# Patient Record
Sex: FEMALE | Race: WHITE | Employment: FULL TIME | URBAN - NONMETROPOLITAN AREA
[De-identification: names, ages, dates, MRNs, and addresses within clinical notes are randomized per-mention and may not be internally consistent; named-entity substitution may affect disease eponyms.]

---

## 2021-09-24 ENCOUNTER — HOSPITAL ENCOUNTER (EMERGENCY)
Age: 21
Discharge: HOME | End: 2021-09-24
Attending: EMERGENCY MEDICINE
Payer: COMMERCIAL

## 2021-09-24 ENCOUNTER — APPOINTMENT (OUTPATIENT)
Dept: GENERAL RADIOLOGY | Age: 21
End: 2021-09-24
Payer: COMMERCIAL

## 2021-09-24 VITALS
OXYGEN SATURATION: 99 % | HEIGHT: 65 IN | WEIGHT: 180 LBS | HEART RATE: 76 BPM | DIASTOLIC BLOOD PRESSURE: 56 MMHG | SYSTOLIC BLOOD PRESSURE: 130 MMHG | BODY MASS INDEX: 29.99 KG/M2 | RESPIRATION RATE: 16 BRPM

## 2021-09-24 DIAGNOSIS — M79.671 ACUTE FOOT PAIN, RIGHT: Primary | ICD-10-CM

## 2021-09-24 LAB
ANION GAP SERPL CALCULATED.3IONS-SCNC: 11 MEQ/L (ref 8–16)
BASOPHILS # BLD: 0.6 %
BASOPHILS ABSOLUTE: 0.1 THOU/MM3 (ref 0–0.1)
BUN BLDV-MCNC: 16 MG/DL (ref 7–22)
C-REACTIVE PROTEIN: 0.49 MG/DL (ref 0–1)
CALCIUM SERPL-MCNC: 9.3 MG/DL (ref 8.5–10.5)
CHLORIDE BLD-SCNC: 102 MEQ/L (ref 98–111)
CO2: 24 MEQ/L (ref 23–33)
CREAT SERPL-MCNC: 0.6 MG/DL (ref 0.4–1.2)
EOSINOPHIL # BLD: 0.4 %
EOSINOPHILS ABSOLUTE: 0 THOU/MM3 (ref 0–0.4)
ERYTHROCYTE [DISTWIDTH] IN BLOOD BY AUTOMATED COUNT: 14.5 % (ref 11.5–14.5)
ERYTHROCYTE [DISTWIDTH] IN BLOOD BY AUTOMATED COUNT: 48.3 FL (ref 35–45)
GFR SERPL CREATININE-BSD FRML MDRD: > 90 ML/MIN/1.73M2
GLUCOSE BLD-MCNC: 90 MG/DL (ref 70–108)
HCT VFR BLD CALC: 40.7 % (ref 37–47)
HEMOGLOBIN: 13.4 GM/DL (ref 12–16)
IMMATURE GRANS (ABS): 0.03 THOU/MM3 (ref 0–0.07)
IMMATURE GRANULOCYTES: 0.3 %
LYMPHOCYTES # BLD: 29 %
LYMPHOCYTES ABSOLUTE: 3 THOU/MM3 (ref 1–4.8)
MCH RBC QN AUTO: 30.2 PG (ref 26–33)
MCHC RBC AUTO-ENTMCNC: 32.9 GM/DL (ref 32.2–35.5)
MCV RBC AUTO: 91.7 FL (ref 81–99)
MONOCYTES # BLD: 5.6 %
MONOCYTES ABSOLUTE: 0.6 THOU/MM3 (ref 0.4–1.3)
NUCLEATED RED BLOOD CELLS: 0 /100 WBC
OSMOLALITY CALCULATION: 274.5 MOSMOL/KG (ref 275–300)
PLATELET # BLD: 342 THOU/MM3 (ref 130–400)
PMV BLD AUTO: 9.7 FL (ref 9.4–12.4)
POTASSIUM REFLEX MAGNESIUM: 4.2 MEQ/L (ref 3.5–5.2)
RBC # BLD: 4.44 MILL/MM3 (ref 4.2–5.4)
SEDIMENTATION RATE, ERYTHROCYTE: 13 MM/HR (ref 0–20)
SEG NEUTROPHILS: 64.1 %
SEGMENTED NEUTROPHILS ABSOLUTE COUNT: 6.6 THOU/MM3 (ref 1.8–7.7)
SODIUM BLD-SCNC: 137 MEQ/L (ref 135–145)
WBC # BLD: 10.3 THOU/MM3 (ref 4.8–10.8)

## 2021-09-24 PROCEDURE — 6370000000 HC RX 637 (ALT 250 FOR IP): Performed by: NURSE PRACTITIONER

## 2021-09-24 PROCEDURE — 99283 EMERGENCY DEPT VISIT LOW MDM: CPT

## 2021-09-24 PROCEDURE — 73630 X-RAY EXAM OF FOOT: CPT

## 2021-09-24 PROCEDURE — 85651 RBC SED RATE NONAUTOMATED: CPT

## 2021-09-24 PROCEDURE — 86140 C-REACTIVE PROTEIN: CPT

## 2021-09-24 PROCEDURE — 80048 BASIC METABOLIC PNL TOTAL CA: CPT

## 2021-09-24 PROCEDURE — 85025 COMPLETE CBC W/AUTO DIFF WBC: CPT

## 2021-09-24 PROCEDURE — 36415 COLL VENOUS BLD VENIPUNCTURE: CPT

## 2021-09-24 RX ORDER — ACETAMINOPHEN 500 MG
1000 TABLET ORAL ONCE
Status: COMPLETED | OUTPATIENT
Start: 2021-09-24 | End: 2021-09-24

## 2021-09-24 RX ADMIN — ACETAMINOPHEN 1000 MG: 500 TABLET ORAL at 12:48

## 2021-09-24 ASSESSMENT — PAIN DESCRIPTION - PAIN TYPE: TYPE: ACUTE PAIN

## 2021-09-24 ASSESSMENT — PAIN SCALES - GENERAL
PAINLEVEL_OUTOF10: 8
PAINLEVEL_OUTOF10: 8

## 2021-09-24 ASSESSMENT — PAIN DESCRIPTION - DESCRIPTORS: DESCRIPTORS: SHOOTING;SHARP

## 2021-09-24 ASSESSMENT — PAIN DESCRIPTION - ORIENTATION: ORIENTATION: RIGHT

## 2021-09-24 ASSESSMENT — PAIN DESCRIPTION - LOCATION: LOCATION: FOOT

## 2021-09-24 NOTE — ED PROVIDER NOTES
[address] [address]          Pt Name: Nicki Lynn  MRN: [id number]  [place] 2000  Date of evaluation: 9/24/2021  Treating Resident Physician: Ashley Valladares MD  Supervising Physician:  Jaden Webster,     [facility]       Chief Complaint   Patient presents with    Foot Pain     right     History obtained from the patient. HISTORY OF PRESENT ILLNESS    HPI  Nicki Lynn is a 22 y.o. female who presents to the emergency department for evaluation of right foot pain. Patient states that she has had right foot pain for the last day. Patient states that she has a raised area on the bottom of her foot does not remember injuring it. Patient states the pain is a 6 out of 10 on pain scale when ambulating. Patient states that pain is worse with movement. Pain is resolved when not walking or touching area. The patient has no other acute complaints at this time. REVIEW OF SYSTEMS   Review of Systems   Constitutional: Negative for fatigue and fever. HENT: Negative for ear pain, rhinorrhea and sore throat. Respiratory: Negative for cough, shortness of breath and wheezing. Cardiovascular: Negative for chest pain, palpitations and leg swelling. Gastrointestinal: Negative for abdominal distention, constipation, diarrhea, nausea and vomiting. Endocrine: Negative for polydipsia and polyuria. Genitourinary: Negative for difficulty urinating and dysuria. Musculoskeletal: Negative for arthralgias. Right foot pain. Skin: Negative for color change, pallor and rash. Neurological: Negative for dizziness, seizures, syncope, weakness and numbness. PAST MEDICAL AND SURGICAL HISTORY   No past medical history on file. No past surgical history on file. MEDICATIONS   No current facility-administered medications for this encounter.     Current Outpatient Medications:    brompheniramine-pseudoephedrine-DM 30-2-10 MG/5ML syrup, Take 5 mLs by mouth 4 times daily as needed for Congestion or Cough. , Disp: 120 mL, Rfl: 0      SOCIAL HISTORY     Social History     Social History Narrative    Not on file     Social History     Tobacco Use    Smoking status: Never Smoker    Smokeless tobacco: Never Used   Substance Use Topics    Alcohol use: No    Drug use: No         ALLERGIES     Allergies   Allergen Reactions    Amoxicillin Nausea And Vomiting         FAMILY HISTORY     Family History   Problem Relation Age of Onset    High Blood Pressure Mother     High Blood Pressure Maternal Grandmother     High Blood Pressure Maternal Grandfather     Heart Disease Maternal Grandfather          PREVIOUS RECORDS   Previous records reviewed: Today    PHYSICAL EXAM     ED Triage Vitals [09/24/21 0955]   BP Temp Temp src Pulse Resp SpO2 Height Weight   112/77 -- -- 90 20 98 % 5' 5\" (1.651 m) 180 lb (81.6 kg)     Initial vital signs and nursing assessment reviewed and normal. Body mass index is 29.95 kg/m². Pulsoximetry is normal per my interpretation. Additional Vital Signs:  Vitals:    09/24/21 1309   BP: (!) 130/56   Pulse: 76   Resp: 16   SpO2: 99%       Physical Exam  Constitutional:       Appearance: Normal appearance. HENT:      Head: Normocephalic. Right Ear: External ear normal.      Left Ear: External ear normal.      Nose: Nose normal.      Mouth/Throat:      Mouth: Mucous membranes are moist.      Pharynx: Oropharynx is clear. Eyes:      Extraocular Movements: Extraocular movements intact. Conjunctiva/sclera: Conjunctivae normal.      Pupils: Pupils are equal, round, and reactive to light. Cardiovascular:      Rate and Rhythm: Normal rate and regular rhythm. Pulses: Normal pulses. Heart sounds: Normal heart sounds. Pulmonary:      Effort: Pulmonary effort is normal.      Breath sounds: Normal breath sounds.    Abdominal:      General: Bowel sounds are normal.      Palpations: Abdomen is soft. Musculoskeletal:         General: Normal range of motion. Cervical back: Normal range of motion and neck supple. Skin:     General: Skin is warm and dry. Capillary Refill: Capillary refill takes less than 2 seconds. Comments: Raised area to planter surface mid metatarsal area. Area tender with palpation. Patient has no erythema or warmth to area. Neurological:      General: No focal deficit present. Mental Status: She is alert and oriented to person, place, and time. MEDICAL DECISION MAKING   Initial Assessment:   Patient is a 41-year-old female complaining of right foot pain. Patient on exam noted with a raised area to the plantar aspect mid metatarsal area. No fluctuance, erythema or warmth to area. Surgery saw patient and did bedside incision. They stated to have patient follow-up outpatient with podiatry. Podiatry resident stated that this area was likely cyst and less likely to be infected abscess. They do not recommend antibiotic therapy at this time. Patient differential diagnosis includes but is not limited to cyst, abscess, Hallman's neuroma, muscular skeletal pain, and blister. Patient will be discharged at this time with right foot pain. Plan:   Patient discharged home at this time instructed to follow-up with podiatry. ED RESULTS   Laboratory results:  Labs Reviewed   CBC WITH AUTO DIFFERENTIAL - Abnormal; Notable for the following components:       Result Value    RDW-SD 48.3 (*)     All other components within normal limits   OSMOLALITY - Abnormal; Notable for the following components:    Osmolality Calc 274.5 (*)     All other components within normal limits   BASIC METABOLIC PANEL W/ REFLEX TO MG FOR LOW K   C-REACTIVE PROTEIN   SEDIMENTATION RATE   ANION GAP   GLOMERULAR FILTRATION RATE, ESTIMATED       Radiologic studies results:  XR FOOT RIGHT (MIN 3 VIEWS)   Final Result    No fracture.

## 2021-09-24 NOTE — ED NOTES
Assumed patient care. Pt resp regular. Pt rates that foot pain is about 6/10 at this time. Pt denies any other needs. Call light within reach.       Yamel Nolen RN  09/24/21 0561

## 2021-09-24 NOTE — CONSULTS
Podiatric Surgery Consult    Reason for Consult:  Right foot pain  Requesting Physician:  Dr. Gary Aldrich DO    CHIEF COMPLAINT:  Right foot pain    HISTORY OF PRESENT ILLNESS:                The patient is a 22 y.o. female with no significant medical history who is seen at bedside on behalf of Dr. Adore Dolan. Patient states she noticed some pain to the ball of her right foot on Tuesday, and the pain has gotten worse to the point that she can no longer bear weight on the right foot. Patient denies trauma to the area. Patient denies N/V/F/SOB/CP or other symptoms at this time. Past Medical History:    No past medical history on file. Past Surgical History:    No past surgical history on file. Current Medications:    No current facility-administered medications for this encounter. Allergies:  Amoxicillin    Social History:    TOBACCO:  Never used tobacco  ETOH:  Never drank alcohol  DRUGS:  Never used recreational drugs  Family History:       Problem Relation Age of Onset    High Blood Pressure Mother     High Blood Pressure Maternal Grandmother     High Blood Pressure Maternal Grandfather     Heart Disease Maternal Grandfather      REVIEW OF SYSTEMS:    Pertinent items are listed in HPI.    PHYSICAL EXAM:        Vitals:    BP (!) 130/56   Pulse 76   Resp 16   Ht 5' 5\" (1.651 m)   Wt 180 lb (81.6 kg)   SpO2 99%   BMI 29.95 kg/m²     Results for orders placed or performed during the hospital encounter of 09/24/21   CBC auto differential   Result Value Ref Range    WBC 10.3 4.8 - 10.8 thou/mm3    RBC 4.44 4.20 - 5.40 mill/mm3    Hemoglobin 13.4 12.0 - 16.0 gm/dl    Hematocrit 40.7 37.0 - 47.0 %    MCV 91.7 81.0 - 99.0 fL    MCH 30.2 26.0 - 33.0 pg    MCHC 32.9 32.2 - 35.5 gm/dl    RDW-CV 14.5 11.5 - 14.5 %    RDW-SD 48.3 (H) 35.0 - 45.0 fL    Platelets 468 660 - 911 thou/mm3    MPV 9.7 9.4 - 12.4 fL    Seg Neutrophils 64.1 %    Lymphocytes 29.0 %    Monocytes 5.6 %    Eosinophils 0.4 %    Basophils 0.6 %    Immature Granulocytes 0.3 %    Segs Absolute 6.6 1 - 7 thou/mm3    Lymphocytes Absolute 3.0 1.0 - 4.8 thou/mm3    Monocytes Absolute 0.6 0.4 - 1.3 thou/mm3    Eosinophils Absolute 0.0 0.0 - 0.4 thou/mm3    Basophils Absolute 0.1 0.0 - 0.1 thou/mm3    Immature Grans (Abs) 0.03 0.00 - 0.07 thou/mm3    nRBC 0 /100 wbc   Basic Metabolic Panel w/ Reflex to MG   Result Value Ref Range    Sodium 137 135 - 145 meq/L    Potassium reflex Magnesium 4.2 3.5 - 5.2 meq/L    Chloride 102 98 - 111 meq/L    CO2 24 23 - 33 meq/L    Glucose 90 70 - 108 mg/dL    BUN 16 7 - 22 mg/dL    CREATININE 0.6 0.4 - 1.2 mg/dL    Calcium 9.3 8.5 - 10.5 mg/dL   C-reactive protein   Result Value Ref Range    CRP 0.49 0.00 - 1.00 mg/dl   Anion Gap   Result Value Ref Range    Anion Gap 11.0 8.0 - 16.0 meq/L   Glomerular Filtration Rate, Estimated   Result Value Ref Range    Est, Glom Filt Rate >90 ml/min/1.73m2   Osmolality   Result Value Ref Range    Osmolality Calc 274.5 (L) 275.0 - 300.0 mOsmol/kg       Exam:     Vascular: Dorsalis pedis and posterior tibial pulses are easily palpable bilaterally. Skin temperature is warm to cool from proximal tibial tuberosity to distal digits. CFT brisk to exposed digits. No edema or erythema noted. Hair growth present. Quality of skin wnl for patient age. Dermatologic: No open lesions noted b/l. Subcutaneous nodule, firm, immobile measuring 0.5cm in diameter present to plantar aspect of right forefoot, sub 3rd metatarsal head. No surrounding erythema or edema noted. No drainage or sinus tract noted. Neurovascular: Proprioception intact at 10/10 sites as tested with a 5.07 semmes arnulfo monofilament. Light touch sensation is intact b/l. Musculoskeletal: Muscle strength 5/5 for all plantarflexors, dorsiflexors, inverters and everters examined. Full ROM noted at the STJ with the knee extended and flexed, AJ, MTJ, 1st MPJ bilaterally. Exquisite pain to palpation of nodule right foot. XRAY:  XR FOOT RIGHT (MIN 3 VIEWS)    Result Date: 9/24/2021  PROCEDURE: XR FOOT RIGHT (MIN 3 VIEWS) CLINICAL INFORMATION: pain. COMPARISON: No prior study. TECHNIQUE: 3 views of the right foot. FINDINGS: There is no fracture or dislocation. The digits are normal.  The metatarsals are normal. The metatarsals are normally aligned with their respective tarsal bones. The base of the fifth metatarsal is normal. The joint spaces are preserved. The calcaneus is normal. The soft tissues are normal.      No fracture. **This report has been created using voice recognition software. It may contain minor errors which are inherent in voice recognition technology. ** Final report electronically signed by DR Elier Vera on 9/24/2021 10:30 AM    LABS:   Results for orders placed or performed during the hospital encounter of 09/24/21   CBC auto differential   Result Value Ref Range    WBC 10.3 4.8 - 10.8 thou/mm3    RBC 4.44 4.20 - 5.40 mill/mm3    Hemoglobin 13.4 12.0 - 16.0 gm/dl    Hematocrit 40.7 37.0 - 47.0 %    MCV 91.7 81.0 - 99.0 fL    MCH 30.2 26.0 - 33.0 pg    MCHC 32.9 32.2 - 35.5 gm/dl    RDW-CV 14.5 11.5 - 14.5 %    RDW-SD 48.3 (H) 35.0 - 45.0 fL    Platelets 308 545 - 612 thou/mm3    MPV 9.7 9.4 - 12.4 fL    Seg Neutrophils 64.1 %    Lymphocytes 29.0 %    Monocytes 5.6 %    Eosinophils 0.4 %    Basophils 0.6 %    Immature Granulocytes 0.3 %    Segs Absolute 6.6 1 - 7 thou/mm3    Lymphocytes Absolute 3.0 1.0 - 4.8 thou/mm3    Monocytes Absolute 0.6 0.4 - 1.3 thou/mm3    Eosinophils Absolute 0.0 0.0 - 0.4 thou/mm3    Basophils Absolute 0.1 0.0 - 0.1 thou/mm3    Immature Grans (Abs) 0.03 0.00 - 0.07 thou/mm3    nRBC 0 /100 wbc   Basic Metabolic Panel w/ Reflex to MG   Result Value Ref Range    Sodium 137 135 - 145 meq/L    Potassium reflex Magnesium 4.2 3.5 - 5.2 meq/L    Chloride 102 98 - 111 meq/L    CO2 24 23 - 33 meq/L    Glucose 90 70 - 108 mg/dL    BUN 16 7 - 22 mg/dL    CREATININE 0.6 0.4 - 1.2 mg/dL Calcium 9.3 8.5 - 10.5 mg/dL   C-reactive protein   Result Value Ref Range    CRP 0.49 0.00 - 1.00 mg/dl   Anion Gap   Result Value Ref Range    Anion Gap 11.0 8.0 - 16.0 meq/L   Glomerular Filtration Rate, Estimated   Result Value Ref Range    Est, Glom Filt Rate >90 ml/min/1.73m2   Osmolality   Result Value Ref Range    Osmolality Calc 274.5 (L) 275.0 - 300.0 mOsmol/kg     Incision and Drainage Note    Type of Incision and Drainage:    [x] Simple    [] Complex    [] Hematoma/Seroma  [] Abscess soft tissue deep   [] Abscess ankle       Performed by: Lauren Ryan DPM    Consent obtained: Yes    Time out taken: Yes    Pain Control:       Drainage Type: serosanguinous    Instruments: 18-gauge needle    Location of Incision and Drainage:    Right foot  Wound #: 1    Incision and Drainage Size cm 0.5 x 0.5cm      Culture sent: No     Bleeding:with a small amount of bleeding    Hemostasis Achieved: by pressure    Procedural Pain: 8  / 10     Post Procedural Pain: 0 / 10     Response to treatment: Well tolerated by patient. Assessment  Principle    Subcutaneous cyst, right foot    Plan  Patient initially examined and evaluated  Cyst to right foot drained without incident see procedure notation above  Dressed right foot with gauze 4x4, lucretia. Instructed patient to bear weight to right foot on heel only  Patient is to follow-up in office with Dr. Adore Dolan as soon as possible  Educated patient on etiology of condition and clinical signs of infection, including drainage, warmth, erythema, and swelling  Patient expressed good understanding of treatment plan  Patient is ok to DC    DISPO: Patient ok to DC per podiatry, patient to follow-up in office with Dr. Adore Dolan. Dr. Isidra Whalen, thank you for the consultation, allowing podiatry to assist in the medical welfare of this patient. Podiatry will continue to follow this patient throughout the duration of hospitalization.    Lauren Ryan DPM  9/24/2021 2:08 PM

## 2021-09-25 ASSESSMENT — ENCOUNTER SYMPTOMS
SHORTNESS OF BREATH: 0
SORE THROAT: 0
COUGH: 0
WHEEZING: 0
CONSTIPATION: 0
NAUSEA: 0
DIARRHEA: 0
COLOR CHANGE: 0
RHINORRHEA: 0
VOMITING: 0
ABDOMINAL DISTENTION: 0

## 2021-09-25 NOTE — ED PROVIDER NOTES
[facility] [address] [address] EMERGENCY DEPT  ED Attending Physician Attestation     I performed a history and physical examination of the patient and discussed management with the Resident. I reviewed the Resident's note and agree with the documented findings and plan of care. Any areas of disagreement are noted on the chart. I was personally present for the key portions of any procedures and have documented in the chart those procedures where I was not present during the key portions. I have reviewed the emergency nurses triage note and agree with the chief complaint, past medical history, past surgical history, allergies, medications, social and family history as documented unless otherwise noted below. For Advanced Practice Clinician cases, I have personally evaluated this patient and have completed at least one key elements of the E/M. Additional findings and any areas of disagreement are as noted. History     22 y.o. female    Painful area to plantar surface of foot  No injury, no foreign body  No fever  Pain both with weight bearing and at rest.    Vitals:    Vitals:    09/24/21 0955 09/24/21 1309   BP: 112/77 (!) 130/56   Pulse: 90 76   Resp: 20 16   SpO2: 98% 99%   Weight: 180 lb (81.6 kg)    Height: 5' 5\" (1.651 m)        Known Problems: There is no problem list on file for this patient. Physical Exam     Extrem:  R foot 2cm area of fluctuance and tenderness. No erythema or induration. No edema, neg homans. DIAGNOSTIC RESULTS   RADIOLOGY:   XR FOOT RIGHT (MIN 3 VIEWS)   Final Result    No fracture. **This report has been created using voice recognition software. It may contain minor errors which are inherent in voice recognition technology. **      Final report electronically signed by DR Ankush Dobbs on 9/24/2021 10:30 AM          LABS:  Labs Reviewed   CBC WITH AUTO DIFFERENTIAL - Abnormal; Notable for the following components:       Result Value    RDW-SD 48.3 (*)     All other components within normal limits   OSMOLALITY - Abnormal; Notable for the following components:    Osmolality Calc 274.5 (*)     All other components within normal limits   BASIC METABOLIC PANEL W/ REFLEX TO MG FOR LOW K   C-REACTIVE PROTEIN   SEDIMENTATION RATE   ANION GAP   GLOMERULAR FILTRATION RATE, ESTIMATED       EMERGENCY DEPARTMENT COURSE:        Medical Decision-Making:   Case d/w podiatry who eval pt at the bedside and would like to I&D    George Scanlon [facility]  Attending Emergency Physician        Francis Grey, DO  09/25/21 5803

## 2021-10-02 ENCOUNTER — APPOINTMENT (OUTPATIENT)
Dept: CT IMAGING | Age: 21
End: 2021-10-02
Payer: COMMERCIAL

## 2021-10-02 ENCOUNTER — HOSPITAL ENCOUNTER (EMERGENCY)
Age: 21
Discharge: HOME HEALTH | End: 2021-10-02
Attending: EMERGENCY MEDICINE
Payer: COMMERCIAL

## 2021-10-02 VITALS
RESPIRATION RATE: 16 BRPM | WEIGHT: 180 LBS | BODY MASS INDEX: 29.99 KG/M2 | HEIGHT: 65 IN | OXYGEN SATURATION: 99 % | DIASTOLIC BLOOD PRESSURE: 76 MMHG | SYSTOLIC BLOOD PRESSURE: 114 MMHG | TEMPERATURE: 98.5 F | HEART RATE: 83 BPM

## 2021-10-02 DIAGNOSIS — L02.611 ABSCESS OF RIGHT FOOT: Primary | ICD-10-CM

## 2021-10-02 LAB
ALBUMIN SERPL-MCNC: 4.5 G/DL (ref 3.5–5.1)
ALP BLD-CCNC: 111 U/L (ref 38–126)
ALT SERPL-CCNC: 15 U/L (ref 11–66)
ANION GAP SERPL CALCULATED.3IONS-SCNC: 11 MEQ/L (ref 8–16)
AST SERPL-CCNC: 16 U/L (ref 5–40)
BASOPHILS # BLD: 0.4 %
BASOPHILS ABSOLUTE: 0 THOU/MM3 (ref 0–0.1)
BILIRUB SERPL-MCNC: 0.2 MG/DL (ref 0.3–1.2)
BUN BLDV-MCNC: 20 MG/DL (ref 7–22)
CALCIUM SERPL-MCNC: 9.9 MG/DL (ref 8.5–10.5)
CHLORIDE BLD-SCNC: 104 MEQ/L (ref 98–111)
CO2: 26 MEQ/L (ref 23–33)
CREAT SERPL-MCNC: 0.7 MG/DL (ref 0.4–1.2)
EOSINOPHIL # BLD: 0.4 %
EOSINOPHILS ABSOLUTE: 0 THOU/MM3 (ref 0–0.4)
ERYTHROCYTE [DISTWIDTH] IN BLOOD BY AUTOMATED COUNT: 14.4 % (ref 11.5–14.5)
ERYTHROCYTE [DISTWIDTH] IN BLOOD BY AUTOMATED COUNT: 47.8 FL (ref 35–45)
GFR SERPL CREATININE-BSD FRML MDRD: > 90 ML/MIN/1.73M2
GLUCOSE BLD-MCNC: 91 MG/DL (ref 70–108)
HCT VFR BLD CALC: 40.9 % (ref 37–47)
HEMOGLOBIN: 13.6 GM/DL (ref 12–16)
IMMATURE GRANS (ABS): 0.03 THOU/MM3 (ref 0–0.07)
IMMATURE GRANULOCYTES: 0.3 %
LYMPHOCYTES # BLD: 28.1 %
LYMPHOCYTES ABSOLUTE: 3.2 THOU/MM3 (ref 1–4.8)
MCH RBC QN AUTO: 30.4 PG (ref 26–33)
MCHC RBC AUTO-ENTMCNC: 33.3 GM/DL (ref 32.2–35.5)
MCV RBC AUTO: 91.3 FL (ref 81–99)
MONOCYTES # BLD: 7.8 %
MONOCYTES ABSOLUTE: 0.9 THOU/MM3 (ref 0.4–1.3)
NUCLEATED RED BLOOD CELLS: 0 /100 WBC
OSMOLALITY CALCULATION: 283.5 MOSMOL/KG (ref 275–300)
PLATELET # BLD: 380 THOU/MM3 (ref 130–400)
PMV BLD AUTO: 9.6 FL (ref 9.4–12.4)
POTASSIUM SERPL-SCNC: 4.3 MEQ/L (ref 3.5–5.2)
RBC # BLD: 4.48 MILL/MM3 (ref 4.2–5.4)
SEG NEUTROPHILS: 63 %
SEGMENTED NEUTROPHILS ABSOLUTE COUNT: 7.2 THOU/MM3 (ref 1.8–7.7)
SODIUM BLD-SCNC: 141 MEQ/L (ref 135–145)
TOTAL PROTEIN: 8.2 G/DL (ref 6.1–8)
WBC # BLD: 11.4 THOU/MM3 (ref 4.8–10.8)

## 2021-10-02 PROCEDURE — 96375 TX/PRO/DX INJ NEW DRUG ADDON: CPT

## 2021-10-02 PROCEDURE — 87205 SMEAR GRAM STAIN: CPT

## 2021-10-02 PROCEDURE — 87186 SC STD MICRODIL/AGAR DIL: CPT

## 2021-10-02 PROCEDURE — 87077 CULTURE AEROBIC IDENTIFY: CPT

## 2021-10-02 PROCEDURE — 99283 EMERGENCY DEPT VISIT LOW MDM: CPT

## 2021-10-02 PROCEDURE — 73701 CT LOWER EXTREMITY W/DYE: CPT

## 2021-10-02 PROCEDURE — 85025 COMPLETE CBC W/AUTO DIFF WBC: CPT

## 2021-10-02 PROCEDURE — 36415 COLL VENOUS BLD VENIPUNCTURE: CPT

## 2021-10-02 PROCEDURE — 6360000002 HC RX W HCPCS: Performed by: EMERGENCY MEDICINE

## 2021-10-02 PROCEDURE — 87070 CULTURE OTHR SPECIMN AEROBIC: CPT

## 2021-10-02 PROCEDURE — 6360000004 HC RX CONTRAST MEDICATION: Performed by: EMERGENCY MEDICINE

## 2021-10-02 PROCEDURE — 2580000003 HC RX 258: Performed by: EMERGENCY MEDICINE

## 2021-10-02 PROCEDURE — 96365 THER/PROPH/DIAG IV INF INIT: CPT

## 2021-10-02 PROCEDURE — 87147 CULTURE TYPE IMMUNOLOGIC: CPT

## 2021-10-02 PROCEDURE — 96367 TX/PROPH/DG ADDL SEQ IV INF: CPT

## 2021-10-02 PROCEDURE — 80053 COMPREHEN METABOLIC PANEL: CPT

## 2021-10-02 PROCEDURE — 87075 CULTR BACTERIA EXCEPT BLOOD: CPT

## 2021-10-02 RX ORDER — LIDOCAINE HYDROCHLORIDE 20 MG/ML
5 INJECTION, SOLUTION INFILTRATION; PERINEURAL ONCE
Status: DISCONTINUED | OUTPATIENT
Start: 2021-10-02 | End: 2021-10-02 | Stop reason: HOSPADM

## 2021-10-02 RX ORDER — SULFAMETHOXAZOLE AND TRIMETHOPRIM 800; 160 MG/1; MG/1
1 TABLET ORAL 2 TIMES DAILY
Qty: 20 TABLET | Refills: 0 | Status: SHIPPED | OUTPATIENT
Start: 2021-10-02 | End: 2021-10-12

## 2021-10-02 RX ORDER — TRAMADOL HYDROCHLORIDE 50 MG/1
50 TABLET ORAL EVERY 4 HOURS PRN
Qty: 18 TABLET | Refills: 0 | Status: SHIPPED | OUTPATIENT
Start: 2021-10-02 | End: 2021-10-05

## 2021-10-02 RX ORDER — CEFAZOLIN SODIUM 2 G/100ML
2000 INJECTION, SOLUTION INTRAVENOUS ONCE
Status: COMPLETED | OUTPATIENT
Start: 2021-10-02 | End: 2021-10-02

## 2021-10-02 RX ORDER — KETOROLAC TROMETHAMINE 30 MG/ML
30 INJECTION, SOLUTION INTRAMUSCULAR; INTRAVENOUS ONCE
Status: COMPLETED | OUTPATIENT
Start: 2021-10-02 | End: 2021-10-02

## 2021-10-02 RX ORDER — SODIUM CHLORIDE 9 MG/ML
INJECTION, SOLUTION INTRAVENOUS CONTINUOUS
Status: DISCONTINUED | OUTPATIENT
Start: 2021-10-02 | End: 2021-10-02 | Stop reason: HOSPADM

## 2021-10-02 RX ADMIN — KETOROLAC TROMETHAMINE 30 MG: 30 INJECTION, SOLUTION INTRAMUSCULAR; INTRAVENOUS at 05:37

## 2021-10-02 RX ADMIN — IOPAMIDOL 80 ML: 755 INJECTION, SOLUTION INTRAVENOUS at 06:08

## 2021-10-02 RX ADMIN — VANCOMYCIN HYDROCHLORIDE 1000 MG: 1 INJECTION, POWDER, LYOPHILIZED, FOR SOLUTION INTRAVENOUS at 09:03

## 2021-10-02 RX ADMIN — SODIUM CHLORIDE: 9 INJECTION, SOLUTION INTRAVENOUS at 05:37

## 2021-10-02 RX ADMIN — CEFAZOLIN SODIUM 2000 MG: 2 INJECTION, SOLUTION INTRAVENOUS at 08:00

## 2021-10-02 ASSESSMENT — ENCOUNTER SYMPTOMS
SHORTNESS OF BREATH: 0
ABDOMINAL PAIN: 0
CONSTIPATION: 0
RHINORRHEA: 0
VOMITING: 0
STRIDOR: 0
ABDOMINAL DISTENTION: 0
EYE REDNESS: 0
DIARRHEA: 0
EYE PAIN: 0
NAUSEA: 0
WHEEZING: 0
EYE ITCHING: 0
BACK PAIN: 0
SORE THROAT: 0
CHEST TIGHTNESS: 0
PHOTOPHOBIA: 0
EYE DISCHARGE: 0
COUGH: 0

## 2021-10-02 ASSESSMENT — PAIN SCALES - GENERAL
PAINLEVEL_OUTOF10: 4
PAINLEVEL_OUTOF10: 2
PAINLEVEL_OUTOF10: 2

## 2021-10-02 ASSESSMENT — PAIN DESCRIPTION - PAIN TYPE: TYPE: ACUTE PAIN

## 2021-10-02 ASSESSMENT — PAIN DESCRIPTION - LOCATION
LOCATION: FOOT
LOCATION: FOOT

## 2021-10-02 NOTE — CONSULTS
Podiatric Surgery Consult    Reason for Consult:  Right foot wound, pain  Requesting Physician:  Farrukh Dubose MD    CHIEF COMPLAINT:  Right foot wound, pain    HISTORY OF PRESENT ILLNESS:                The patient is a 22 y.o. female without significant past medical history who is being seen at bedside with complaints of a potential abscess on the plantar aspect of her right foot. Patient was in the emergency room within the past 2 weeks with complaints of a firm nodule on the plantar aspect of her foot. This was drained at this point in time and was deemed to not need antibiotics. However in the ensuing week and a half, this proceeded to get larger and more painful. Patient has scheduled an appointment with Dr. Colt Nguyễn Tuesday, October 5, however symptoms became bad enough that she decided not to wait for that time. Patient thus elected to return to the emergency department for further treatment patient is not performing much treatment of this foot. Pain is present on the area of the abscess on the  inferior aspect of her foot. No pain elsewhere in the foot. Patient denies any nausea, vomiting, fever, chills, chest pain, shortness of breath. No other pedal complaints. Past Medical History:    History reviewed. No pertinent past medical history. Past Surgical History:    History reviewed. No pertinent surgical history. Current Medications:    Current Facility-Administered Medications: 0.9 % sodium chloride infusion, , IntraVENous, Continuous  lidocaine 2 % injection 5 mL, 5 mL, IntraDERmal, Once  Allergies:  Amoxicillin    Social History:    TOBACCO:   reports that she has never smoked. She has never used smokeless tobacco.  ETOH:   reports no history of alcohol use. DRUGS:   reports no history of drug use.   Family History:       Problem Relation Age of Onset    High Blood Pressure Mother     High Blood Pressure Maternal Grandmother     High Blood Pressure Maternal Grandfather     Heart Disease Maternal Grandfather      REVIEW OF SYSTEMS:    Pertinent review systems in HPI  PHYSICAL EXAM:      Vitals:    /76   Pulse 83   Temp 98.5 °F (36.9 °C) (Oral)   Resp 16   Ht 5' 5\" (1.651 m)   Wt 180 lb (81.6 kg)   SpO2 99%   BMI 29.95 kg/m²     Exam:     Vascular: Dorsalis pedis and posterior tibial pulses are palpable bilaterally. Skin temperature is warm to warm from proximal tibial tuberosity to distal digits. CFT brisk to exposed digits. Edema present plantar right foot. Hair growth present. Quality of skin normotrophic. Dermatologic: Patient has a fluctuant lesion present to the plantar aspect of her right foot. This is painful to touch. Upon incision into this lesion, purulence was expressed along with hemorrhagic fluid. Subcutaneous tissues present at base of wound. Wound does not probe to deeper aspect of foot. Minimal erythema and edema in the perilesional tissues. Neurovascular: Light touch sensation grossly intact of the digits bilaterally    Musculoskeletal: Muscle strength grossly intact for all plantarflexors, dorsiflexors, inverters and everters examined, formal muscle strength grading deferred secondary to pain. Pain to palpation of lesion plantar aspect of right foot. Rectus foot and ankle bilaterally. IMAGING: XR FOOT RIGHT (MIN 3 VIEWS)    Result Date: 9/24/2021  PROCEDURE: XR FOOT RIGHT (MIN 3 VIEWS) CLINICAL INFORMATION: pain. COMPARISON: No prior study. TECHNIQUE: 3 views of the right foot. FINDINGS: There is no fracture or dislocation. The digits are normal.  The metatarsals are normal. The metatarsals are normally aligned with their respective tarsal bones. The base of the fifth metatarsal is normal. The joint spaces are preserved. The calcaneus is normal. The soft tissues are normal.      No fracture. **This report has been created using voice recognition software. It may contain minor errors which are inherent in voice recognition technology. ** Final report electronically signed by DR Johnson Clinton on 9/24/2021 10:30 AM    CT FOOT RIGHT W CONTRAST    Result Date: 10/2/2021  CT - Lower Extremity W RIGHT FOOT Comparison: CR,SR - XR FOOT RIGHT (MIN 3 VIEWS) - 09/24/2021 10:02 AM EDT FINDINGS: Bones/joints: No acute fracture or dislocation. No destructive changes to suggest osteomyelitis. No suspicious osseous lesions. Soft tissues: Peripherally enhancing 2.3 x 4.0 x 0.8 cm subcutaneous fluid collection concerning for abscess along the soft tissues plantar to the 2nd metatarsophalangeal joint. No soft tissue gas. Plantar soft tissue abscess as described. No soft tissue gas. No changes of osteomyelitis. No acute fracture or dislocation. This document has been electronically signed by: Sherita Gunn MD on 10/02/2021 06:51 AM All CTs at this facility use dose modulation techniques and iterative reconstructions, and/or weight-based dosing when appropriate to reduce radiation to a low as reasonably achievable. LABS:  Recent Labs     10/02/21  0531   WBC 11.4*   HGB 13.6   HCT 40.9           Recent Labs     10/02/21  0531      K 4.3      CO2 26   BUN 20   CREATININE 0.7        Recent Labs     10/02/21  0531   PROT 8.2*      No results for input(s): CKTOTAL, CKMB, CKMBINDEX, TROPONINI in the last 72 hours. PROCEDURE:  After consent was obtained, 10 cc of 2% lidocaine plain were used to anesthetize patient's plantar foot utilizing a tibial nerve block. Following this, the patient's foot was prepped with Betadine. An 11 blade was used to incise into the lesion on the plantar aspect of the foot. Purulence was expressed from the lesion at that point in time. The lesion was the removed. The tissue was removed included epidermis and dermis. A swab culture was taken, this was then sent for aerobic and anaerobic analysis. The incision was carried down to the level of the subcutaneous tissues.  The wound was probed and did not progress any of the deep compartments of the foot. No foreign body was visualized during the procedure. The wound measured roughly 3.0 cm x 1.5 cm with roughly 0.3 cm of depth. The wound was then thoroughly irrigated with 50 cc of a mixture of Betadine and sterile saline. The wound was then dressed with iodoform packing, gauze, ABD pad, Kerlix, Ace bandage. Patient tolerated the procedure well without complication    Assessment  Principle  1) Abscess, right plantar foot    Plan  -Patient initially examined and evaluated  -White blood cell count 11.4  -CT reviewed: Demonstrating abscess plantar right foot  -Incision and drainage of this lesion was performed at bedside. See note above. -Swab culture taken of the wound, sent for aerobic and anaerobic analysis  -Wound dressed with iodoform packing, gauze, ABD pad, Kerlix, Ace bandage  -Patient instructed to change his dressing once tomorrow Philipp 10/3  -Nonweightbearing right lower extremity. Crutches per ED  -Pain control per ED  -IV antibiotics in the emergency department per ED, recommend course of Bactrim at discharge  -Patient was instructed to call Dr. Olga Gandhi office on Monday, October 4 and ask for an appointment that day for follow-up. -All patient's questions were answered to her satisfaction    DISPO: Patient is okay for discharge, follow-up outpatient with Dr. Devaughn Hudson on Monday, October 4, call for an appointment    Dr. Israel Milan, thank you for the consultation, allowing podiatry to assist in the medical welfare of this patient. Podiatry will continue to follow this patient throughout the duration of hospitalization.    Enrique Daniels DPM DPM  10/2/2021 8:18 AM

## 2021-10-02 NOTE — ED TRIAGE NOTES
Patient presents to the ED with complaints of a wound check. Patient states that she developed a small bump to the superior aspect of the bottom of her foot approximately a week and a half ago. Patient was seen here about a week ago where she states she had an I&D performed on the bump but not much was drainage was collected. Patient states it has now grown larger and her pain to the area has increased. Patient rates pain to the area as a 4/10 in severity. Patient denies any other symptoms.

## 2021-10-02 NOTE — ED PROVIDER NOTES
[address] ENCOUNTER      PATIENT NAME: Cecily Carmona  MRN: [id number]  : 2000  SCHMIDT: 10/2/2021  PROVIDER: Pramod Le MD      CHIEF COMPLAINT       Chief Complaint   Patient presents with    Wound Check       Patient is seen and evaluated in a timely fashion. Nurses Notes are reviewed and I agree except as noted in the HPI. HISTORY OF PRESENT ILLNESS    Cecily Carmona is a 22 y.o. female who presents to Emergency Department with Wound Check     72-year-old female presents to ED complaining of right foot pain and wound check. She has been having a small right foot plantar sore for about 10 days, she was seen at [facility] ED 2021, I&D was performed by podiatry resident with impression likely a cyst instead of infected abscess. Antibiotic was not recommended per Podiatrist. Patient has been having increasing pain, swelling, and possible abscess formation to right foot plantar side. Pain is 10/10, worse on walking. No fever and no chills. This HPI was provided by patient. REVIEW OF SYSTEMS   Review of Systems   Constitutional: Negative for activity change, appetite change, chills, fatigue, fever and unexpected weight change. HENT: Negative for congestion, ear discharge, ear pain, hearing loss, nosebleeds, rhinorrhea and sore throat. Eyes: Negative for photophobia, pain, discharge, redness and itching. Respiratory: Negative for cough, chest tightness, shortness of breath, wheezing and stridor. Cardiovascular: Negative for chest pain, palpitations and leg swelling. Gastrointestinal: Negative for abdominal distention, abdominal pain, constipation, diarrhea, nausea and vomiting. Endocrine: Negative for cold intolerance, heat intolerance, polydipsia and polyphagia. Genitourinary: Negative for dysuria, flank pain, frequency and hematuria. Musculoskeletal: Positive for gait problem.  Negative for arthralgias, back pain, myalgias, neck pain and neck stiffness. Skin: Positive for wound. Negative for pallor and rash. Allergic/Immunologic: Negative for environmental allergies and food allergies. Neurological: Negative for dizziness, tremors, syncope, weakness and headaches. Psychiatric/Behavioral: Negative for agitation, behavioral problems, confusion, self-injury, sleep disturbance and suicidal ideas. PAST MEDICAL HISTORY   History reviewed. No pertinent past medical history. SURGICAL HISTORY     History reviewed. No pertinent surgical history. CURRENT MEDICATIONS       Discharge Medication List as of 10/2/2021  7:30 AM          ALLERGIES     Amoxicillin    FAMILY HISTORY     She indicated that her mother is alive. She indicated that her father is alive. She indicated that the status of her maternal grandmother is unknown. She indicated that the status of her maternal grandfather is unknown.   family history includes Heart Disease in her maternal grandfather; High Blood Pressure in her maternal grandfather, maternal grandmother, and mother. SOCIAL HISTORY      reports that she has never smoked. She has never used smokeless tobacco. She reports that she does not drink alcohol and does not use drugs. PHYSICAL EXAM      height is 5' 5\" (1.651 m) and weight is 180 lb (81.6 kg). Her oral temperature is 98.5 °F (36.9 °C). Her blood pressure is 114/76 and her pulse is 83. Her respiration is 16 and oxygen saturation is 99%. Physical Exam  Vitals and nursing note reviewed. Constitutional:       Appearance: She is well-developed. She is not diaphoretic. HENT:      Head: Normocephalic and atraumatic. Nose: Nose normal.   Eyes:      General: No scleral icterus. Right eye: No discharge. Left eye: No discharge. Conjunctiva/sclera: Conjunctivae normal.      Pupils: Pupils are equal, round, and reactive to light. Neck:      Vascular: No JVD. Trachea: No tracheal deviation.    Cardiovascular:      Rate and Rhythm: Normal rate and regular rhythm. Heart sounds: Normal heart sounds. No murmur heard. No friction rub. No gallop. Pulmonary:      Effort: Pulmonary effort is normal. No respiratory distress. Breath sounds: Normal breath sounds. No stridor. No wheezing or rales. Chest:      Chest wall: No tenderness. Abdominal:      General: Bowel sounds are normal. There is no distension. Palpations: Abdomen is soft. There is no mass. Tenderness: There is no abdominal tenderness. There is no guarding or rebound. Hernia: No hernia is present. Musculoskeletal:         General: Swelling and tenderness present. No deformity. Cervical back: Normal range of motion and neck supple. Comments: Right foot plantar abscess, see picture   Lymphadenopathy:      Cervical: No cervical adenopathy. Skin:     General: Skin is warm and dry. Capillary Refill: Capillary refill takes less than 2 seconds. Coloration: Skin is not pale. Findings: No erythema or rash. Neurological:      Mental Status: She is alert and oriented to person, place, and time. Cranial Nerves: No cranial nerve deficit. Sensory: No sensory deficit. Motor: No abnormal muscle tone. Coordination: Coordination normal.      Deep Tendon Reflexes: Reflexes normal.   Psychiatric:         Behavior: Behavior normal.         Thought Content: Thought content normal.         Judgment: Judgment normal.           DIFFERETIAL DIAGNOSIS   Plantar wart, cellulitis, plantar abscess, foot deep tissue infection    ANCILLARY TEST RESULTS   EKG: Interpreted by me  None    LAB RESULTS:  Results for orders placed or performed during the hospital encounter of 10/02/21   Culture, Anaerobic and Aerobic    Specimen: Foot   Result Value Ref Range    Gram Stain Result       Few segmented neutrophils observed. Moderate epithelial cells observed. Many gram positive cocci in pairs and clusters.     CBC Auto Differential Result Value Ref Range    WBC 11.4 (H) 4.8 - 10.8 thou/mm3    RBC 4.48 4.20 - 5.40 mill/mm3    Hemoglobin 13.6 12.0 - 16.0 gm/dl    Hematocrit 40.9 37.0 - 47.0 %    MCV 91.3 81.0 - 99.0 fL    MCH 30.4 26.0 - 33.0 pg    MCHC 33.3 32.2 - 35.5 gm/dl    RDW-CV 14.4 11.5 - 14.5 %    RDW-SD 47.8 (H) 35.0 - 45.0 fL    Platelets 089 588 - 089 thou/mm3    MPV 9.6 9.4 - 12.4 fL    Seg Neutrophils 63.0 %    Lymphocytes 28.1 %    Monocytes 7.8 %    Eosinophils 0.4 %    Basophils 0.4 %    Immature Granulocytes 0.3 %    Segs Absolute 7.2 1 - 7 thou/mm3    Lymphocytes Absolute 3.2 1.0 - 4.8 thou/mm3    Monocytes Absolute 0.9 0.4 - 1.3 thou/mm3    Eosinophils Absolute 0.0 0.0 - 0.4 thou/mm3    Basophils Absolute 0.0 0.0 - 0.1 thou/mm3    Immature Grans (Abs) 0.03 0.00 - 0.07 thou/mm3    nRBC 0 /100 wbc   Comprehensive Metabolic Panel   Result Value Ref Range    Glucose 91 70 - 108 mg/dL    CREATININE 0.7 0.4 - 1.2 mg/dL    BUN 20 7 - 22 mg/dL    Sodium 141 135 - 145 meq/L    Potassium 4.3 3.5 - 5.2 meq/L    Chloride 104 98 - 111 meq/L    CO2 26 23 - 33 meq/L    Calcium 9.9 8.5 - 10.5 mg/dL    AST 16 5 - 40 U/L    Alkaline Phosphatase 111 38 - 126 U/L    Total Protein 8.2 (H) 6.1 - 8.0 g/dL    Albumin 4.5 3.5 - 5.1 g/dL    Total Bilirubin 0.2 (L) 0.3 - 1.2 mg/dL    ALT 15 11 - 66 U/L   Anion Gap   Result Value Ref Range    Anion Gap 11.0 8.0 - 16.0 meq/L   Glomerular Filtration Rate, Estimated   Result Value Ref Range    Est, Glom Filt Rate >90 ml/min/1.73m2   Osmolality   Result Value Ref Range    Osmolality Calc 283.5 275.0 - 300.0 mOsmol/kg       RADIOLOGY REPORTS  CT FOOT RIGHT W CONTRAST   Final Result   Plantar soft tissue abscess as described. No soft tissue gas. No changes of osteomyelitis. No acute fracture or dislocation. This document has been electronically signed by:  Raymona Sever, MD on    10/02/2021 06:51 AM      All CTs at this facility use dose modulation techniques and iterative    reconstructions, and/or weight-based dosing   when appropriate to reduce radiation to a low as reasonably achievable. MEDICAL DEDISION MAKINGS AND ED COURSE     ED Vitals:  Vitals:    10/02/21 0501 10/02/21 0600   BP: 125/82 114/76   Pulse: 83 83   Resp: 18 16   Temp: 98.5 °F (36.9 °C)    TempSrc: Oral    SpO2: 99% 99%   Weight: 180 lb (81.6 kg)    Height: 5' 5\" (1.651 m)        Actions:   Large-bore IV  Labs  IV Toradol for pain  Right foot CT with contrast    MDM:  Patient's history and exam suggest this is an abscess to right plantar side, concern is that patient may develop deep tissue infection. Labs and CT foot with contrast are indicated. Labs and CT are reviewed. Podiatry on call is consulted and resident physician down in ED and performed I and D. See his consult note. Discharged with Dr. Merari Jefferson office follow up this Monday on 10/7/2021. See below for details. CRITICAL CARE   None    CONSULTS   Podiatrist on call    PROCEDURES   I and D performed by podiatry on call    FINAL IMPRESSION AND DISPOSITION      1. Abscess of right foot        Home    PATIENT REFERRED TO:  Jonn Niño, [address] [id number] [phone]    On 10/4/2021  ED discharge follow up. DISCHARGE MEDICATIONS:  Discharge Medication List as of 10/2/2021  7:30 AM      START taking these medications    Details   sulfamethoxazole-trimethoprim (BACTRIM DS) 800-160 MG per tablet Take 1 tablet by mouth 2 times daily for 10 days, Disp-20 tablet, R-0Print      traMADol (ULTRAM) 50 MG tablet Take 1 tablet by mouth every 4 hours as needed for Pain for up to 3 days. Intended supply: 3 days.  Take lowest dose possible to manage pain, Disp-18 tablet, R-0Print             (Please note that portions of this note were completed with a voice recognition program.  Efforts were made to edit the dictations but occasionally words aremis-transcribed.)    MD Nirav Gordillo MD  10/02/21 [id number]

## 2021-10-02 NOTE — LETTER
[facility] [address] [address] EMERGENCY DEPT  [id number] [place] [address]  Phone: [phone]               October 2, 2021    Patient: Khalif Man   YOB: 2000   Date of Visit: 10/2/2021       To Whom It May Concern:    Beau Canales was seen and treated in our emergency department on 10/2/2021. She may return to work/school on 10/6/2021.       Sincerely,       Navya Castle RN

## 2021-10-07 LAB
AEROBIC CULTURE: ABNORMAL
ANAEROBIC CULTURE: ABNORMAL
GRAM STAIN RESULT: ABNORMAL
ORGANISM: ABNORMAL

## 2021-10-09 NOTE — PROGRESS NOTES
Pharmacy Note  ED Culture Follow-up    Jennifer Machado is a 22 y.o. female. Allergies: Amoxicillin     Labs:  Lab Results   Component Value Date    BUN 20 10/02/2021    CREATININE 0.7 10/02/2021    WBC 11.4 (H) 10/02/2021     Estimated Creatinine Clearance: 135 mL/min (based on SCr of 0.7 mg/dL). Current antimicrobials:   Bactrim DS x 10 days    ASSESSMENT:  Micro results:   Wound culture: positive for MSSA     PLAN:  Need for intervention: No  Discussed with: Trmeaine Trujillo PA-C  Chosen treatment:    Patient already on appropriate treatment as above    Patient response:   No need to contact patient    Called/sent in prescription to: Not applicable    Please call with any questions.  [address], [facility], PharmD 9:12 PM 10/8/2021 details…

## 2023-09-07 ENCOUNTER — APPOINTMENT (OUTPATIENT)
Dept: GENERAL RADIOLOGY | Age: 23
End: 2023-09-07

## 2023-09-07 ENCOUNTER — HOSPITAL ENCOUNTER (EMERGENCY)
Age: 23
Discharge: HOME | End: 2023-09-07
Attending: EMERGENCY MEDICINE

## 2023-09-07 VITALS
SYSTOLIC BLOOD PRESSURE: 142 MMHG | TEMPERATURE: 97.8 F | HEIGHT: 65 IN | OXYGEN SATURATION: 99 % | WEIGHT: 180 LBS | RESPIRATION RATE: 16 BRPM | HEART RATE: 77 BPM | BODY MASS INDEX: 29.99 KG/M2 | DIASTOLIC BLOOD PRESSURE: 83 MMHG

## 2023-09-07 DIAGNOSIS — R10.9 NONSPECIFIC ABDOMINAL PAIN: Primary | ICD-10-CM

## 2023-09-07 DIAGNOSIS — K59.00 CONSTIPATION, UNSPECIFIED CONSTIPATION TYPE: ICD-10-CM

## 2023-09-07 LAB
ALBUMIN SERPL BCP-MCNC: 4.2 GM/DL (ref 3.4–5)
ALP SERPL-CCNC: 99 U/L (ref 46–116)
ALT SERPL W P-5'-P-CCNC: 17 U/L (ref 14–63)
AMORPH SED URNS QL MICRO: ABNORMAL
ANION GAP SERPL CALC-SCNC: 10 MEQ/L (ref 8–16)
AST SERPL W P-5'-P-CCNC: 16 U/L (ref 15–37)
BACTERIA URNS QL MICRO: ABNORMAL
BASOPHILS # BLD: 0.2 % (ref 0–3)
BASOPHILS ABSOLUTE: 0 THOU/MM3 (ref 0–0.1)
BILIRUB SERPL-MCNC: 0.3 MG/DL (ref 0.2–1)
BILIRUB UR QL STRIP.AUTO: NEGATIVE
BUN SERPL-MCNC: 14 MG/DL (ref 7–18)
CALCIUM SERPL-MCNC: 9.6 MG/DL (ref 8.5–10.1)
CASTS #/AREA URNS LPF: ABNORMAL /LPF
CHARACTER UR: ABNORMAL
CHLORIDE SERPL-SCNC: 100 MEQ/L (ref 98–107)
CO2 SERPL-SCNC: 29 MEQ/L (ref 21–32)
COLOR UR AUTO: YELLOW
CREAT SERPL-MCNC: 1 MG/DL (ref 0.6–1.3)
CRYSTALS VITF MICRO: ABNORMAL
EOSINOPHILS ABSOLUTE: 0 THOU/MM3 (ref 0–0.5)
EOSINOPHILS RELATIVE PERCENT: 0.4 % (ref 0–4)
EPI CELLS #/AREA URNS HPF: ABNORMAL /HPF
GFR SERPL CREATININE-BSD FRML MDRD: > 60 ML/MIN/1.73M2
GLUCOSE SERPL-MCNC: 93 MG/DL (ref 74–106)
GLUCOSE UR QL STRIP.AUTO: NEGATIVE MG/DL
HCG UR QL: NEGATIVE
HCT VFR BLD CALC: 40.3 % (ref 37–47)
HEMOGLOBIN: 13.7 GM/DL (ref 12–16)
HGB UR STRIP.AUTO-MCNC: NEGATIVE MG/L
IMMATURE GRANS (ABS): 0.02 THOU/MM3 (ref 0–0.07)
IMMATURE GRANULOCYTES: 0 %
KETONES UR QL STRIP.AUTO: NEGATIVE
LEUKOCYTE ESTERASE UR QL STRIP.AUTO: ABNORMAL
LIPASE SERPL-CCNC: 68 U/L (ref 73–393)
LYMPHOCYTES # BLD AUTO: 29.1 % (ref 15–47)
LYMPHOCYTES ABSOLUTE: 2.9 THOU/MM3 (ref 1–4.8)
MCH RBC QN AUTO: 31 PG (ref 26–32)
MCHC RBC AUTO-ENTMCNC: 34 GM/DL (ref 31–35)
MCV RBC AUTO: 91.2 FL (ref 81–99)
MONOCYTES: 0.7 THOU/MM3 (ref 0.3–1.3)
MONOCYTES: 6.8 % (ref 0–12)
MUCOUS THREADS URNS QL MICRO: ABNORMAL
NITRITE UR QL STRIP.AUTO: NEGATIVE
PDW BLD-RTO: 12.5 % (ref 11.5–14.9)
PH UR STRIP.AUTO: 6 [PH] (ref 5–9)
PLATELET # BLD AUTO: 313 THOU/MM3 (ref 130–400)
PMV BLD AUTO: 9.5 FL (ref 9.4–12.4)
POTASSIUM SERPL-SCNC: 3.8 MEQ/L (ref 3.5–5.1)
PROT SERPL-MCNC: 8.9 GM/DL (ref 6.4–8.2)
PROT UR STRIP.AUTO-MCNC: NEGATIVE MG/DL
RBC # BLD: 4.42 MILL/MM3 (ref 4.1–5.3)
RBC #/AREA URNS HPF: ABNORMAL /HPF
REFLEX TO URINE C & S: ABNORMAL
SEG NEUTROPHILS: 63.3 % (ref 43–75)
SEGMENTED NEUTROPHILS ABSOLUTE COUNT: 6.3 THOU/MM3 (ref 1.8–7.7)
SODIUM SERPL-SCNC: 139 MEQ/L (ref 136–145)
SP GR UR STRIP.AUTO: >= 1.03 (ref 1–1.03)
UROBILINOGEN UR STRIP-ACNC: 0.2 EU/DL (ref 0–1)
WBC # BLD: 10 THOU/MM3 (ref 4.8–10.8)
WBC # UR STRIP.AUTO: ABNORMAL /HPF

## 2023-09-07 PROCEDURE — 80053 COMPREHEN METABOLIC PANEL: CPT

## 2023-09-07 PROCEDURE — 99284 EMERGENCY DEPT VISIT MOD MDM: CPT

## 2023-09-07 PROCEDURE — 6370000000 HC RX 637 (ALT 250 FOR IP): Performed by: EMERGENCY MEDICINE

## 2023-09-07 PROCEDURE — 85025 COMPLETE CBC W/AUTO DIFF WBC: CPT

## 2023-09-07 PROCEDURE — 81001 URINALYSIS AUTO W/SCOPE: CPT

## 2023-09-07 PROCEDURE — 87086 URINE CULTURE/COLONY COUNT: CPT

## 2023-09-07 PROCEDURE — 84703 CHORIONIC GONADOTROPIN ASSAY: CPT

## 2023-09-07 PROCEDURE — 83690 ASSAY OF LIPASE: CPT

## 2023-09-07 PROCEDURE — 74018 RADEX ABDOMEN 1 VIEW: CPT

## 2023-09-07 RX ADMIN — Medication: at 19:08

## 2023-09-07 ASSESSMENT — PAIN - FUNCTIONAL ASSESSMENT
PAIN_FUNCTIONAL_ASSESSMENT: NONE - DENIES PAIN
PAIN_FUNCTIONAL_ASSESSMENT: 0-10

## 2023-09-07 ASSESSMENT — LIFESTYLE VARIABLES
HOW MANY STANDARD DRINKS CONTAINING ALCOHOL DO YOU HAVE ON A TYPICAL DAY: PATIENT DOES NOT DRINK
HOW OFTEN DO YOU HAVE A DRINK CONTAINING ALCOHOL: NEVER

## 2023-09-07 ASSESSMENT — PAIN SCALES - GENERAL: PAINLEVEL_OUTOF10: 6

## 2023-09-07 ASSESSMENT — PAIN DESCRIPTION - ORIENTATION: ORIENTATION: MID;UPPER

## 2023-09-07 ASSESSMENT — PAIN DESCRIPTION - LOCATION: LOCATION: ABDOMEN

## 2023-09-07 ASSESSMENT — PAIN DESCRIPTION - DESCRIPTORS: DESCRIPTORS: SHOOTING

## 2023-09-07 NOTE — ED TRIAGE NOTES
Mid upper abdominal pain. States it felt like cramps then moved up. C/o nausea, denies vomiting or diarrhea or fever. Pt states it hurts most with moving around at work and at night when trying to sleep.

## 2023-09-08 NOTE — ED NOTES
Pt stable A&O x 3 given discharge and follow up info. Pt voiced no concerns and discharged from ER to self to home. Pt ambulated out of ER with no complications .        Amada Shaffer RN  09/07/23 [id number]

## 2023-09-08 NOTE — DISCHARGE INSTRUCTIONS
Please take 1 pack of MiraLAX daily for 5 days  Follow-up with your family doctor.   If you do not have one, please call   Please return if worse or new symptoms

## 2023-09-09 LAB
BACTERIA UR CULT: ABNORMAL
ORGANISM: ABNORMAL